# Patient Record
Sex: FEMALE | Race: WHITE | Employment: PART TIME | URBAN - METROPOLITAN AREA
[De-identification: names, ages, dates, MRNs, and addresses within clinical notes are randomized per-mention and may not be internally consistent; named-entity substitution may affect disease eponyms.]

---
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
## 2021-01-28 ENCOUNTER — TELEPHONE (OUTPATIENT)
Dept: SCHEDULING | Facility: IMAGING CENTER | Age: 21
End: 2021-01-28

## 2021-02-10 ENCOUNTER — OFFICE VISIT (OUTPATIENT)
Dept: MEDICAL GROUP | Facility: MEDICAL CENTER | Age: 21
End: 2021-02-10
Payer: COMMERCIAL

## 2021-02-10 ENCOUNTER — HOSPITAL ENCOUNTER (OUTPATIENT)
Facility: MEDICAL CENTER | Age: 21
End: 2021-02-10
Attending: FAMILY MEDICINE
Payer: COMMERCIAL

## 2021-02-10 VITALS
OXYGEN SATURATION: 99 % | HEIGHT: 69 IN | HEART RATE: 89 BPM | SYSTOLIC BLOOD PRESSURE: 100 MMHG | TEMPERATURE: 99 F | DIASTOLIC BLOOD PRESSURE: 70 MMHG | RESPIRATION RATE: 18 BRPM | BODY MASS INDEX: 20.2 KG/M2 | WEIGHT: 136.35 LBS

## 2021-02-10 DIAGNOSIS — Z30.09 ENCOUNTER FOR COUNSELING REGARDING CONTRACEPTION: ICD-10-CM

## 2021-02-10 DIAGNOSIS — Z11.3 SCREENING FOR STD (SEXUALLY TRANSMITTED DISEASE): ICD-10-CM

## 2021-02-10 DIAGNOSIS — F90.9 ATTENTION DEFICIT HYPERACTIVITY DISORDER (ADHD), UNSPECIFIED ADHD TYPE: ICD-10-CM

## 2021-02-10 DIAGNOSIS — Z00.00 ANNUAL PHYSICAL EXAM: ICD-10-CM

## 2021-02-10 PROCEDURE — 87591 N.GONORRHOEAE DNA AMP PROB: CPT

## 2021-02-10 PROCEDURE — 99395 PREV VISIT EST AGE 18-39: CPT | Performed by: FAMILY MEDICINE

## 2021-02-10 PROCEDURE — 87491 CHLMYD TRACH DNA AMP PROBE: CPT

## 2021-02-10 ASSESSMENT — ENCOUNTER SYMPTOMS
CONSTIPATION: 0
MYALGIAS: 0
PALPITATIONS: 0
NAUSEA: 0
WEIGHT LOSS: 0
FEVER: 0
DOUBLE VISION: 0
DIZZINESS: 0
DIARRHEA: 0
COUGH: 0
BRUISES/BLEEDS EASILY: 0
WEAKNESS: 0
DEPRESSION: 0
BLURRED VISION: 0
SHORTNESS OF BREATH: 0
CHILLS: 0

## 2021-02-10 ASSESSMENT — PATIENT HEALTH QUESTIONNAIRE - PHQ9: CLINICAL INTERPRETATION OF PHQ2 SCORE: 0

## 2021-02-11 DIAGNOSIS — Z11.3 SCREENING FOR STD (SEXUALLY TRANSMITTED DISEASE): ICD-10-CM

## 2021-02-11 NOTE — PROGRESS NOTES
cc: well exam    Subjective:     Halie Lobo is a 20 y.o. female presents for a routine preventive health exam.    Encounter for counseling regarding contraception  Patient is currently on Depo and does not like the side effects regarding her menstruation.    She is concerned about how long she has been on this medication, 2 years.  She would like information about other forms of contraception.  No family history of clotting disorders, denies migraine, and she currently does not smoke cigarettes.    ADHD  She was diagnosed at the age of 18 by her pediatrician.  They started her on a medication at a low dose but that did not help with her inattention.  She was curious about starting a new medication.  She does not know the name of the medication that she was previously taking.      Ob-Gyn/ History:    /Para:    Last Pap Smear: This is not indicated at this time.   Current Contraceptive Method: On Depo shot.  She is currently sexually active with the same partner.  Last menstrual period: Irregular periods due to Depo shot.  Folate intake: Not currently taking.      Health Maintenance  Diet: Seen by GI for stomach upset.  She is currently gluten free, lactose free aside from cheese, presbyterian.  Exercise: Skiing, teaching ski race team, high intensity workouts.   Substance Abuse: Rare alcohol use   Safe in relationship.   Seat belts, bike helmet, gun safety discussed.  Sun protection used.    Cancer screening  Colorectal Cancer Screening: No family history   Lung Cancer Screening: She does not currently smoke   Cervical Cancer Screening: It is not recommended for her at this time due to her age.     Breast Cancer Screening: Both paternal and maternal grandmothers are positive for breast cancer.  She says that they were diagnosed in their 60s.    Prostate Cancer Screening/PSA: No family history    Infectious disease screening/Immunizations  --STI Screening: He would like to get tested for chlamydia  and gonorrhea.    --Practices safe sex.  --HIV Screening: Patient not interested at this time   --Immunizations:    Influenza: Unable to do today as we do not have influenza vaccines in the office.  She has been advised to go to [facility] pharmacy in [employer] to get her flu shot.   HPV: Patient is up-to-date   Tetanus: Patient is up-to-date    Other immunizations: She is showing that she is in need of her meningococcal B vaccine.  She feels as though she did receive this vaccine.  She will obtain her immunization records from her pediatrician's office.      Review of Systems   Constitutional: Negative for chills, fever and weight loss.   HENT: Negative for hearing loss.    Eyes: Negative for blurred vision and double vision.   Respiratory: Negative for cough and shortness of breath.    Cardiovascular: Negative for chest pain, palpitations and leg swelling.   Gastrointestinal: Negative for constipation, diarrhea and nausea.   Genitourinary: Negative.    Musculoskeletal: Negative for myalgias.   Skin: Negative for rash.   Neurological: Negative for dizziness and weakness.   Endo/Heme/Allergies: Does not bruise/bleed easily.   Psychiatric/Behavioral: Negative for depression.          Current Outpatient Medications:   •  medroxyPROGESTERone Acetate (DEPO-PROVERA IM), Inject  into the shoulder, thigh, or buttocks., Disp: , Rfl:     No Known Allergies    Past Medical History:   Diagnosis Date   • ADHD     Dx at 18 yrs     History reviewed. No pertinent surgical history.  Family History   Problem Relation Age of Onset   • Cancer Mother         pancreatic   • No Known Problems Father    • No Known Problems Sister    • No Known Problems Brother    • Cancer Maternal Grandmother         breast   • Heart Disease Maternal Grandfather    • Cancer Paternal Grandmother         breast     Social History     Tobacco Use   • Smoking status: Never Smoker   • Smokeless tobacco: Never Used   Substance Use Topics   • Alcohol use: Yes      "Alcohol/week: 0.6 oz     Types: 1 Cans of beer per week     Comment: 1 x weekly   • Drug use: Yes     Types: Marijuana     Comment: once monthly        Objective:     /70 (BP Location: Left arm, Patient Position: Sitting, BP Cuff Size: Adult)   Pulse 89   Temp 37.2 °C (99 °F) (Temporal)   Resp 18   Ht 1.753 m (5' 9\")   Wt 61.8 kg (136 lb 5.7 oz)   SpO2 99%  Body mass index is 20.14 kg/m².    Physical Exam   Constitutional: She is oriented to person, place, and time and well-developed, well-nourished, and in no distress. No distress.   HENT:   Head: Normocephalic and atraumatic.   Eyes: Pupils are equal, round, and reactive to light.   Cardiovascular: Normal rate and regular rhythm. Exam reveals no gallop and no friction rub.   No murmur heard.  Pulmonary/Chest: Effort normal and breath sounds normal. She has no wheezes. She has no rales.   Abdominal: Soft. Bowel sounds are normal. There is no abdominal tenderness.   Musculoskeletal:      Cervical back: Normal range of motion and neck supple.   Neurological: She is alert and oriented to person, place, and time. Gait normal.   Skin: Skin is warm and dry. No rash noted.   Psychiatric: Affect normal.        Assessment/Plan:     1. Annual physical exam  Patient Counseling:  --Discussed moderation in sodium/caffeine intake, saturated fat and cholesterol, caloric balance, sufficient fresh fruits/vegetables, fiber, iron, and 0.4-0.8mg of folate supplement per day (for females capable of pregnancy).  --Discussed brushing, flossing, and dental visits.   --Encouraged regular exercise.   --Discussed tobacco, alcohol, or other drug use; availability of treatment for abuse.   --Discussed sexually transmitted infections, partner selection, use of condoms, avoidance of unintended pregnancy and contraceptive alternatives.  --Injury prevention: Discussed safety belts, safety helmets, smoke detector, etc.      2. Encounter for counseling regarding contraception  Patient is " just received her Depo shot 2 months ago it is not indicated at this time.  Did discuss with her at length about the different forms of contraception.  She states that she will schedule appointment with me when she has decided which form of birth control she would like to go on.  Did advise her that we do not place the implants more the IUD in the office.      3. Attention deficit hyperactivity disorder (ADHD), unspecified ADHD type  Patient is unsure what medication that she was on that did not work for her.  She will obtain her pediatrician's health records.  Will discuss options for her on her next appointment.    4. Screening for STD (sexually transmitted disease)  Urine collected in the office.  Patient was instructed to provide a first catch urine to be sent  - CHLAMYDIA/GC PCR URINE OR SWAB; Future    Preventive visit in 1 year, sooner as needed for any concerns.     I have placed the above orders and discussed them with an approved delegating provider. The MA is performing the above orders under the direction of Dr. Dumont    Please note that this dictation was created using voice recognition software. I have made every reasonable attempt to correct obvious errors, but I expect that there are errors of grammar and possibly content that I did not discover before finalizing the note.

## 2021-02-11 NOTE — ASSESSMENT & PLAN NOTE
She was diagnosed at the age of 18 by her pediatrician.  They started her on a medication at a low dose but that did not help with her inattention.  She was curious about starting a new medication.  She does not know the name of the medication that she was previously taking.

## 2021-02-11 NOTE — ASSESSMENT & PLAN NOTE
Patient is currently on Depo and does not like the side effects regarding her menstruation.    She is concerned about how long she has been on this medication, 2 years.  She would like information about other forms of contraception.  No family history of clotting disorders, denies migraine, and she currently does not smoke cigarettes.

## 2021-02-12 LAB
C TRACH DNA SPEC QL NAA+PROBE: NEGATIVE
N GONORRHOEA DNA SPEC QL NAA+PROBE: NEGATIVE
SPECIMEN SOURCE: NORMAL

## 2021-11-04 ENCOUNTER — HOSPITAL ENCOUNTER (OUTPATIENT)
Facility: MEDICAL CENTER | Age: 21
End: 2021-11-04
Attending: STUDENT IN AN ORGANIZED HEALTH CARE EDUCATION/TRAINING PROGRAM
Payer: COMMERCIAL

## 2021-11-04 ENCOUNTER — OFFICE VISIT (OUTPATIENT)
Dept: URGENT CARE | Facility: CLINIC | Age: 21
End: 2021-11-04
Payer: COMMERCIAL

## 2021-11-04 VITALS
TEMPERATURE: 98.6 F | DIASTOLIC BLOOD PRESSURE: 72 MMHG | OXYGEN SATURATION: 100 % | WEIGHT: 142.6 LBS | HEART RATE: 99 BPM | SYSTOLIC BLOOD PRESSURE: 116 MMHG | HEIGHT: 70 IN | BODY MASS INDEX: 20.41 KG/M2 | RESPIRATION RATE: 16 BRPM

## 2021-11-04 DIAGNOSIS — J32.9 RHINOSINUSITIS: ICD-10-CM

## 2021-11-04 DIAGNOSIS — U07.1 COVID-19 VIRUS INFECTION: ICD-10-CM

## 2021-11-04 LAB
EXTERNAL QUALITY CONTROL: NORMAL
INT CON NEG: NORMAL
INT CON POS: NORMAL
S PYO AG THROAT QL: NEGATIVE
SARS-COV+SARS-COV-2 AG RESP QL IA.RAPID: NEGATIVE

## 2021-11-04 PROCEDURE — 87426 SARSCOV CORONAVIRUS AG IA: CPT | Mod: QW | Performed by: STUDENT IN AN ORGANIZED HEALTH CARE EDUCATION/TRAINING PROGRAM

## 2021-11-04 PROCEDURE — U0005 INFEC AGEN DETEC AMPLI PROBE: HCPCS

## 2021-11-04 PROCEDURE — 99213 OFFICE O/P EST LOW 20 MIN: CPT | Performed by: STUDENT IN AN ORGANIZED HEALTH CARE EDUCATION/TRAINING PROGRAM

## 2021-11-04 PROCEDURE — U0003 INFECTIOUS AGENT DETECTION BY NUCLEIC ACID (DNA OR RNA); SEVERE ACUTE RESPIRATORY SYNDROME CORONAVIRUS 2 (SARS-COV-2) (CORONAVIRUS DISEASE [COVID-19]), AMPLIFIED PROBE TECHNIQUE, MAKING USE OF HIGH THROUGHPUT TECHNOLOGIES AS DESCRIBED BY CMS-2020-01-R: HCPCS

## 2021-11-04 PROCEDURE — 87880 STREP A ASSAY W/OPTIC: CPT | Performed by: STUDENT IN AN ORGANIZED HEALTH CARE EDUCATION/TRAINING PROGRAM

## 2021-11-04 RX ORDER — AMOXICILLIN AND CLAVULANATE POTASSIUM 875; 125 MG/1; MG/1
1 TABLET, FILM COATED ORAL 2 TIMES DAILY
Qty: 10 TABLET | Refills: 0 | Status: SHIPPED | OUTPATIENT
Start: 2021-11-04 | End: 2021-11-09

## 2021-11-04 RX ORDER — DEXTROAMPHETAMINE SACCHARATE, AMPHETAMINE ASPARTATE MONOHYDRATE, DEXTROAMPHETAMINE SULFATE AND AMPHETAMINE SULFATE 2.5; 2.5; 2.5; 2.5 MG/1; MG/1; MG/1; MG/1
10 CAPSULE, EXTENDED RELEASE ORAL EVERY MORNING
COMMUNITY

## 2021-11-04 NOTE — PROGRESS NOTES
Subjective:   CHIEF COMPLAINT  Chief Complaint   Patient presents with   • Pharyngitis     sore throat, cough, congestion, migraines, ears plugged       HPI  Halie Lobo is a 21 y.o. female who presents sore throat, nasal congestion, sore throat and cough.  Symptoms initially started approximately 1 month ago, get better and then return.  She says 4 days ago they have gotten progressively worse. Tried dayquil and nyquil which only provides transient relief. No wheezing or SOB. No fever, n/v. No sick contacts. She is vaccinated against covid.     REVIEW OF SYSTEMS  General: no fever or chills  GI: no nausea or vomiting  See HPI for further details.     PAST MEDICAL HISTORY  Patient Active Problem List    Diagnosis Date Noted   • Encounter for counseling regarding contraception 02/10/2021   • ADHD 02/10/2021   • Annual physical exam 02/10/2021       SURGICAL HISTORY  patient denies any surgical history    ALLERGIES  No Known Allergies    CURRENT MEDICATIONS  Home Medications     Reviewed by Shaye Storey (Radiology Technologist) on 11/04/21 at 1535  Med List Status: <None>   Medication Last Dose Status   amphetamine-dextroamphetamine XR (ADDERALL XR) 10 MG CAPSULE SR 24 HR Taking Active   medroxyPROGESTERone Acetate (DEPO-PROVERA IM) Not Taking Active                SOCIAL HISTORY  Social History     Tobacco Use   • Smoking status: Never Smoker   • Smokeless tobacco: Never Used   Vaping Use   • Vaping Use: Never used   Substance and Sexual Activity   • Alcohol use: Yes     Alcohol/week: 0.6 oz     Types: 1 Cans of beer per week     Comment: 2 x weekly   • Drug use: Yes     Types: Marijuana     Comment: once monthly   • Sexual activity: Yes     Partners: Male     Birth control/protection: Injection       FAMILY HISTORY  Family History   Problem Relation Age of Onset   • Cancer Mother         pancreatic   • No Known Problems Father    • No Known Problems Sister    • No Known Problems Brother    • Cancer Maternal  "Grandmother         breast   • Heart Disease Maternal Grandfather    • Cancer Paternal Grandmother         breast          Objective:   PHYSICAL EXAM  VITAL SIGNS: /72 (BP Location: Left arm, Patient Position: Sitting)   Pulse 99   Temp 37 °C (98.6 °F) (Temporal)   Resp 16   Ht 1.778 m (5' 10\")   Wt 64.7 kg (142 lb 9.6 oz)   SpO2 100%   BMI 20.46 kg/m²     Gen: no acute distress, normal voice  Skin: dry, intact, moist mucosal membranes  ENT: Minimal pharyngeal erythema without exudates.  Uvula midline.  Lungs: CTAB w/ symmetric expansion  CV: RRR w/o murmurs or clicks  Psych: normal affect, normal judgement, alert, awake    POC Covid: Negative  Rapid strep: Negative    Assessment/Plan:     1. Rhinosinusitis  COVID/SARS CoV-2 PCR    POCT SARS-COV Antigen LINDSAY Manual Result    POCT Rapid Strep A    amoxicillin-clavulanate (AUGMENTIN) 875-125 MG Tab   2. COVID-19 virus infection     21-year-old female with sinus pain and pressure associated with sore throat for 1 month with initial improvement of symptoms and then return of symptoms 4 days ago consistent with a bacterial sinus infection.  She is immunized against Covid.  -Ordered Augmentin  -Ordered Covid PCR.  Results will be sent to [facility]    Differential diagnosis, natural history, supportive care, and indications for immediate follow-up discussed. Patient agrees with the plan of care.    Follow-up as needed if symptoms worsen or fail to improve to PCP, Urgent care or Emergency Room.       Please note that this dictation was created using voice recognition software. I have made a reasonable attempt to correct obvious errors, but I expect that there are errors of grammar and possibly content that I did not discover before finalizing the note.  "

## 2021-11-05 DIAGNOSIS — J32.9 RHINOSINUSITIS: ICD-10-CM

## 2021-11-05 LAB — COVID ORDER STATUS COVID19: NORMAL

## 2021-11-05 RX ORDER — DEXTROAMPHETAMINE SACCHARATE, AMPHETAMINE ASPARTATE, DEXTROAMPHETAMINE SULFATE AND AMPHETAMINE SULFATE 2.5; 2.5; 2.5; 2.5 MG/1; MG/1; MG/1; MG/1
TABLET ORAL
COMMUNITY
Start: 2021-09-15

## 2021-11-05 RX ORDER — LINACLOTIDE 72 UG/1
CAPSULE, GELATIN COATED ORAL
COMMUNITY
Start: 2021-09-14

## 2021-11-05 RX ORDER — LEVONORGESTREL AND ETHINYL ESTRADIOL 6-5-10
KIT ORAL
COMMUNITY
Start: 2021-09-06

## 2021-11-06 LAB
SARS-COV-2 RNA RESP QL NAA+PROBE: NOTDETECTED
SPECIMEN SOURCE: NORMAL

## 2025-06-03 ENCOUNTER — HOSPITAL ENCOUNTER (EMERGENCY)
Facility: MEDICAL CENTER | Age: 25
End: 2025-06-04
Attending: STUDENT IN AN ORGANIZED HEALTH CARE EDUCATION/TRAINING PROGRAM
Payer: COMMERCIAL

## 2025-06-03 DIAGNOSIS — N12 PYELONEPHRITIS: Primary | ICD-10-CM

## 2025-06-03 LAB
ALBUMIN SERPL BCP-MCNC: 4.1 G/DL (ref 3.2–4.9)
ALBUMIN/GLOB SERPL: 1.3 G/DL
ALP SERPL-CCNC: 64 U/L (ref 30–99)
ALT SERPL-CCNC: 11 U/L (ref 2–50)
ANION GAP SERPL CALC-SCNC: 10 MMOL/L (ref 7–16)
APPEARANCE UR: ABNORMAL
AST SERPL-CCNC: 20 U/L (ref 12–45)
BACTERIA #/AREA URNS HPF: ABNORMAL /HPF
BASOPHILS # BLD AUTO: 0.9 % (ref 0–1.8)
BASOPHILS # BLD: 0.08 K/UL (ref 0–0.12)
BILIRUB SERPL-MCNC: 0.3 MG/DL (ref 0.1–1.5)
BILIRUB UR QL STRIP.AUTO: NEGATIVE
BUN SERPL-MCNC: 10 MG/DL (ref 8–22)
CALCIUM ALBUM COR SERPL-MCNC: 9.5 MG/DL (ref 8.5–10.5)
CALCIUM SERPL-MCNC: 9.6 MG/DL (ref 8.5–10.5)
CASTS URNS QL MICRO: ABNORMAL /LPF (ref 0–2)
CHLORIDE SERPL-SCNC: 103 MMOL/L (ref 96–112)
CO2 SERPL-SCNC: 25 MMOL/L (ref 20–33)
COLOR UR: YELLOW
CREAT SERPL-MCNC: 0.75 MG/DL (ref 0.5–1.4)
EOSINOPHIL # BLD AUTO: 0.21 K/UL (ref 0–0.51)
EOSINOPHIL NFR BLD: 2.3 % (ref 0–6.9)
EPITHELIAL CELLS 1715: ABNORMAL /HPF (ref 0–5)
ERYTHROCYTE [DISTWIDTH] IN BLOOD BY AUTOMATED COUNT: 37.1 FL (ref 35.9–50)
GFR SERPLBLD CREATININE-BSD FMLA CKD-EPI: 114 ML/MIN/1.73 M 2
GLOBULIN SER CALC-MCNC: 3.2 G/DL (ref 1.9–3.5)
GLUCOSE SERPL-MCNC: 102 MG/DL (ref 65–99)
GLUCOSE UR STRIP.AUTO-MCNC: NEGATIVE MG/DL
HCG SERPL QL: NEGATIVE
HCT VFR BLD AUTO: 41.5 % (ref 37–47)
HGB BLD-MCNC: 14.4 G/DL (ref 12–16)
IMM GRANULOCYTES # BLD AUTO: 0.06 K/UL (ref 0–0.11)
IMM GRANULOCYTES NFR BLD AUTO: 0.7 % (ref 0–0.9)
KETONES UR STRIP.AUTO-MCNC: NEGATIVE MG/DL
LEUKOCYTE ESTERASE UR QL STRIP.AUTO: ABNORMAL
LIPASE SERPL-CCNC: 33 U/L (ref 11–82)
LYMPHOCYTES # BLD AUTO: 2.03 K/UL (ref 1–4.8)
LYMPHOCYTES NFR BLD: 22.6 % (ref 22–41)
MCH RBC QN AUTO: 32.3 PG (ref 27–33)
MCHC RBC AUTO-ENTMCNC: 34.7 G/DL (ref 32.2–35.5)
MCV RBC AUTO: 93 FL (ref 81.4–97.8)
MICRO URNS: ABNORMAL
MONOCYTES # BLD AUTO: 0.66 K/UL (ref 0–0.85)
MONOCYTES NFR BLD AUTO: 7.3 % (ref 0–13.4)
NEUTROPHILS # BLD AUTO: 5.95 K/UL (ref 1.82–7.42)
NEUTROPHILS NFR BLD: 66.2 % (ref 44–72)
NITRITE UR QL STRIP.AUTO: NEGATIVE
NRBC # BLD AUTO: 0 K/UL
NRBC BLD-RTO: 0 /100 WBC (ref 0–0.2)
PH UR STRIP.AUTO: 8 [PH] (ref 5–8)
PLATELET # BLD AUTO: 374 K/UL (ref 164–446)
PMV BLD AUTO: 8.9 FL (ref 9–12.9)
POTASSIUM SERPL-SCNC: 3.8 MMOL/L (ref 3.6–5.5)
PROT SERPL-MCNC: 7.3 G/DL (ref 6–8.2)
PROT UR QL STRIP: 30 MG/DL
RBC # BLD AUTO: 4.46 M/UL (ref 4.2–5.4)
RBC # URNS HPF: ABNORMAL /HPF
RBC UR QL AUTO: ABNORMAL
SODIUM SERPL-SCNC: 138 MMOL/L (ref 135–145)
SP GR UR STRIP.AUTO: 1.01
UROBILINOGEN UR STRIP.AUTO-MCNC: 0.2 EU/DL
WBC # BLD AUTO: 9 K/UL (ref 4.8–10.8)
WBC #/AREA URNS HPF: >100 /HPF

## 2025-06-03 PROCEDURE — 99284 EMERGENCY DEPT VISIT MOD MDM: CPT

## 2025-06-03 PROCEDURE — 700111 HCHG RX REV CODE 636 W/ 250 OVERRIDE (IP): Mod: JZ | Performed by: STUDENT IN AN ORGANIZED HEALTH CARE EDUCATION/TRAINING PROGRAM

## 2025-06-03 PROCEDURE — 36415 COLL VENOUS BLD VENIPUNCTURE: CPT

## 2025-06-03 PROCEDURE — 96374 THER/PROPH/DIAG INJ IV PUSH: CPT

## 2025-06-03 PROCEDURE — 700102 HCHG RX REV CODE 250 W/ 637 OVERRIDE(OP): Performed by: STUDENT IN AN ORGANIZED HEALTH CARE EDUCATION/TRAINING PROGRAM

## 2025-06-03 PROCEDURE — 87186 SC STD MICRODIL/AGAR DIL: CPT

## 2025-06-03 PROCEDURE — 84703 CHORIONIC GONADOTROPIN ASSAY: CPT

## 2025-06-03 PROCEDURE — 87086 URINE CULTURE/COLONY COUNT: CPT

## 2025-06-03 PROCEDURE — 85025 COMPLETE CBC W/AUTO DIFF WBC: CPT

## 2025-06-03 PROCEDURE — A9270 NON-COVERED ITEM OR SERVICE: HCPCS | Performed by: STUDENT IN AN ORGANIZED HEALTH CARE EDUCATION/TRAINING PROGRAM

## 2025-06-03 PROCEDURE — 87077 CULTURE AEROBIC IDENTIFY: CPT

## 2025-06-03 PROCEDURE — 80053 COMPREHEN METABOLIC PANEL: CPT

## 2025-06-03 PROCEDURE — 83690 ASSAY OF LIPASE: CPT

## 2025-06-03 PROCEDURE — 81001 URINALYSIS AUTO W/SCOPE: CPT

## 2025-06-03 RX ORDER — ACETAMINOPHEN 500 MG
1000 TABLET ORAL ONCE
Status: COMPLETED | OUTPATIENT
Start: 2025-06-03 | End: 2025-06-03

## 2025-06-03 RX ORDER — SULFAMETHOXAZOLE AND TRIMETHOPRIM 800; 160 MG/1; MG/1
1 TABLET ORAL 2 TIMES DAILY
Qty: 20 TABLET | Refills: 0 | Status: ACTIVE | OUTPATIENT
Start: 2025-06-03 | End: 2025-06-13

## 2025-06-03 RX ORDER — CEFTRIAXONE 2 G/1
2000 INJECTION, POWDER, FOR SOLUTION INTRAMUSCULAR; INTRAVENOUS ONCE
Status: COMPLETED | OUTPATIENT
Start: 2025-06-03 | End: 2025-06-03

## 2025-06-03 RX ADMIN — ACETAMINOPHEN 1000 MG: 500 TABLET ORAL at 23:14

## 2025-06-03 RX ADMIN — CEFTRIAXONE SODIUM 2000 MG: 2 INJECTION, POWDER, FOR SOLUTION INTRAMUSCULAR; INTRAVENOUS at 23:30

## 2025-06-03 NOTE — LETTER
6/6/2025               Halie Lobo  [address] [place] [address]        Dear Halie (MR#[id number])    As we have been unable to contact you by phone, this letter is sent in regards to your recent visit to the [facility] Emergency Department on 6/3/2025. During the visit, tests were performed to assist the physician in a medical diagnosis. A review of those tests requires that we notify you of the following:    Your urine culture and sensitivity was POSITIVE for a bacteria called Escherichia coli, and further treatment may be necessary. The currently prescribed antibiotic (sulfamethoxazole-trimethoprim) may not be effective in treating your infection. IF YOU ARE NOT FEELING BETTER PLEASE CONTACT ME AS SOON AS POSSIBLE AT THE NUMBER BELOW.       Thank you for your cooperation in the matter.    Sincerely,  ED Culture Follow-Up Staff  Monet Lei, PharmD    [facility], Emergency Department  [address] [place] [id number] [phone] (ED Culture Line)

## 2025-06-04 ENCOUNTER — APPOINTMENT (OUTPATIENT)
Dept: RADIOLOGY | Facility: MEDICAL CENTER | Age: 25
End: 2025-06-04
Attending: STUDENT IN AN ORGANIZED HEALTH CARE EDUCATION/TRAINING PROGRAM
Payer: COMMERCIAL

## 2025-06-04 VITALS
WEIGHT: 154.98 LBS | TEMPERATURE: 98 F | BODY MASS INDEX: 22.24 KG/M2 | SYSTOLIC BLOOD PRESSURE: 113 MMHG | HEART RATE: 67 BPM | DIASTOLIC BLOOD PRESSURE: 86 MMHG | RESPIRATION RATE: 12 BRPM | OXYGEN SATURATION: 99 %

## 2025-06-04 PROCEDURE — 74176 CT ABD & PELVIS W/O CONTRAST: CPT

## 2025-06-04 PROCEDURE — 96375 TX/PRO/DX INJ NEW DRUG ADDON: CPT

## 2025-06-04 PROCEDURE — 700111 HCHG RX REV CODE 636 W/ 250 OVERRIDE (IP): Mod: JZ | Performed by: STUDENT IN AN ORGANIZED HEALTH CARE EDUCATION/TRAINING PROGRAM

## 2025-06-04 RX ORDER — KETOROLAC TROMETHAMINE 15 MG/ML
15 INJECTION, SOLUTION INTRAMUSCULAR; INTRAVENOUS ONCE
Status: COMPLETED | OUTPATIENT
Start: 2025-06-04 | End: 2025-06-04

## 2025-06-04 RX ORDER — ONDANSETRON 4 MG/1
4 TABLET, ORALLY DISINTEGRATING ORAL EVERY 6 HOURS PRN
Qty: 10 TABLET | Refills: 0 | Status: SHIPPED | OUTPATIENT
Start: 2025-06-04

## 2025-06-04 RX ADMIN — KETOROLAC TROMETHAMINE 15 MG: 15 INJECTION, SOLUTION INTRAMUSCULAR; INTRAVENOUS at 00:53

## 2025-06-04 NOTE — ED TRIAGE NOTES
Chief Complaint   Patient presents with    Flank Pain     Left flank pain radiating to the left pelvis since 8 am. Pt denies nausea, vomiting.      /86   Pulse 72   Temp 36.9 °C (98.5 °F) (Temporal)   Resp 18   Wt 70.3 kg (154 lb 15.7 oz)   LMP 05/13/2025 (Approximate)   SpO2 100%   BMI 22.24 kg/m²

## 2025-06-04 NOTE — DISCHARGE INSTRUCTIONS
We have given you a prescription for antibiotics which she should take as directed for the next 10 days.  We have also given you prescription for nausea medication.  You can use up to 1000 mg of Tylenol every 8 hours as needed for pain.  You can use naproxen or ibuprofen for pain.  If you have uncontrolled pain, vomiting, not eating or drinking please return to the emergency room.  Otherwise you should follow-up closely with your primary care provider.

## 2025-06-04 NOTE — ED PROVIDER NOTES
ED Provider Note    CHIEF COMPLAINT  Chief Complaint   Patient presents with    Flank Pain     Left flank pain radiating to the left pelvis since 8 am. Pt denies nausea, vomiting.        EXTERNAL RECORDS REVIEWED  Outpatient Notes family medicine visit from 11/4/2020 for    HPI/ROS  LIMITATION TO HISTORY     OUTSIDE HISTORIAN(S):  Family father at bedside    Halie Lobo is a 25 y.o. female who presents with left flank pain.  Patient says that since 8 AM this morning she has had worsening left flank pain.  Patient says that pain has been waxing waning throughout the day at times and severe.  Patient denies associated fever, chills, nausea, vomiting, vaginal bleeding, vaginal discharge, urinary changes.  Patient says that there is some pain radiation to her lower abdomen.  Patient denies chest pain, shortness of breath or cough.    PAST MEDICAL HISTORY   has a past medical history of ADHD.    SURGICAL HISTORY  patient denies any surgical history    FAMILY HISTORY  Family History   Problem Relation Age of Onset    Cancer Mother         pancreatic    No Known Problems Father     No Known Problems Sister     No Known Problems Brother     Cancer Maternal Grandmother         breast    Heart Disease Maternal Grandfather     Cancer Paternal Grandmother         breast       SOCIAL HISTORY  Social History     Tobacco Use    Smoking status: Never    Smokeless tobacco: Never   Vaping Use    Vaping status: Never Used   Substance and Sexual Activity    Alcohol use: Yes     Alcohol/week: 0.6 oz     Types: 1 Cans of beer per week     Comment: 2 x weekly    Drug use: Yes     Types: Marijuana     Comment: once monthly    Sexual activity: Yes     Partners: Male     Birth control/protection: Injection       CURRENT MEDICATIONS  Home Medications       Reviewed by Segundo Recinos R.N. (Registered Nurse) on 06/03/25 at 2053  Med List Status: Not Addressed     Medication Last Dose Status   amphetamine-dextroamphetamine  (ADDERALL) 10 MG Tab  Active   amphetamine-dextroamphetamine XR (ADDERALL XR) 10 MG CAPSULE SR 24 HR  Active   Levonorg-Eth Estrad Triphasic 50-30/75-40/ 125-30 MCG Tab  Active   LINZESS 72 MCG Cap  Active   medroxyPROGESTERone Acetate (DEPO-PROVERA IM)  Active                  Audit from Redirected Encounters    **Home medications have not yet been reviewed for this encounter**         ALLERGIES  Allergies[1]    PHYSICAL EXAM  VITAL SIGNS: /86   Pulse 72   Temp 36.9 °C (98.5 °F) (Temporal)   Resp 18   Wt 70.3 kg (154 lb 15.7 oz)   LMP 05/13/2025 (Approximate)   SpO2 100%   BMI 22.24 kg/m²    Constitutional: Alert in no apparent distress.  HENT: No signs of trauma, Bilateral external ears normal, Nose normal.   Eyes: Pupils are equal and reactive, Conjunctiva normal, Non-icteric.   Neck: Normal range of motion, No tenderness, Supple, No stridor.   Cardiovascular: Regular rate and rhythm, no murmurs.   Thorax & Lungs: Normal breath sounds, No respiratory distress, No wheezing, No chest tenderness.   Abdomen: Bowel sounds normal, Soft, No tenderness, No masses, No pulsatile masses.   Skin: Warm, Dry, No erythema, No rash.   Back: No bony tenderness, left-sided CVA tenderness  Extremities: Intact distal pulses, No edema, No tenderness, No cyanosis  Musculoskeletal: Good range of motion in all major joints. No tenderness to palpation or major deformities noted.   Neurologic: Alert , Normal motor function, Normal sensory function, No focal deficits noted.       EKG/LABS  Results for orders placed or performed during the hospital encounter of 06/03/25   URINALYSIS,CULTURE IF INDICATED    Collection Time: 06/03/25  9:25 PM    Specimen: Urine, Clean Catch   Result Value Ref Range    Color Yellow     Character Cloudy (A)     Specific Gravity 1.014 <1.035    Ph 8.0 5.0 - 8.0    Glucose Negative Negative mg/dL    Ketones Negative Negative mg/dL    Protein 30 (A) Negative mg/dL    Bilirubin Negative Negative     Urobilinogen, Urine 0.2 <=1.0 EU/dL    Nitrite Negative Negative    Leukocyte Esterase Moderate (A) Negative    Occult Blood Moderate (A) Negative    Micro Urine Req Microscopic    URINE MICROSCOPIC (W/UA)    Collection Time: 06/03/25  9:25 PM   Result Value Ref Range    WBC >100 (A) /hpf    RBC 21-50 (A) /hpf    Bacteria Mod None /hpf    Epithelial Cells 3-5 0 - 5 /hpf    Urine Casts 0-2 0 - 2 /lpf   CBC WITH DIFFERENTIAL    Collection Time: 06/03/25  9:26 PM   Result Value Ref Range    WBC 9.0 4.8 - 10.8 K/uL    RBC 4.46 4.20 - 5.40 M/uL    Hemoglobin 14.4 12.0 - 16.0 g/dL    Hematocrit 41.5 37.0 - 47.0 %    MCV 93.0 81.4 - 97.8 fL    MCH 32.3 27.0 - 33.0 pg    MCHC 34.7 32.2 - 35.5 g/dL    RDW 37.1 35.9 - 50.0 fL    Platelet Count 374 164 - 446 K/uL    MPV 8.9 (L) 9.0 - 12.9 fL    Neutrophils-Polys 66.20 44.00 - 72.00 %    Lymphocytes 22.60 22.00 - 41.00 %    Monocytes 7.30 0.00 - 13.40 %    Eosinophils 2.30 0.00 - 6.90 %    Basophils 0.90 0.00 - 1.80 %    Immature Granulocytes 0.70 0.00 - 0.90 %    Nucleated RBC 0.00 0.00 - 0.20 /100 WBC    Neutrophils (Absolute) 5.95 1.82 - 7.42 K/uL    Lymphs (Absolute) 2.03 1.00 - 4.80 K/uL    Monos (Absolute) 0.66 0.00 - 0.85 K/uL    Eos (Absolute) 0.21 0.00 - 0.51 K/uL    Baso (Absolute) 0.08 0.00 - 0.12 K/uL    Immature Granulocytes (abs) 0.06 0.00 - 0.11 K/uL    NRBC (Absolute) 0.00 K/uL   COMP METABOLIC PANEL    Collection Time: 06/03/25  9:26 PM   Result Value Ref Range    Sodium 138 135 - 145 mmol/L    Potassium 3.8 3.6 - 5.5 mmol/L    Chloride 103 96 - 112 mmol/L    Co2 25 20 - 33 mmol/L    Anion Gap 10.0 7.0 - 16.0    Glucose 102 (H) 65 - 99 mg/dL    Bun 10 8 - 22 mg/dL    Creatinine 0.75 0.50 - 1.40 mg/dL    Calcium 9.6 8.5 - 10.5 mg/dL    Correct Calcium 9.5 8.5 - 10.5 mg/dL    AST(SGOT) 20 12 - 45 U/L    ALT(SGPT) 11 2 - 50 U/L    Alkaline Phosphatase 64 30 - 99 U/L    Total Bilirubin 0.3 0.1 - 1.5 mg/dL    Albumin 4.1 3.2 - 4.9 g/dL    Total Protein 7.3 6.0 - 8.2  g/dL    Globulin 3.2 1.9 - 3.5 g/dL    A-G Ratio 1.3 g/dL   LIPASE    Collection Time: 06/03/25  9:26 PM   Result Value Ref Range    Lipase 33 11 - 82 U/L   HCG QUAL SERUM    Collection Time: 06/03/25  9:26 PM   Result Value Ref Range    Beta-Hcg Qualitative Serum Negative Negative   ESTIMATED GFR    Collection Time: 06/03/25  9:26 PM   Result Value Ref Range    GFR (CKD-EPI) 114 >60 mL/min/1.73 m 2   URINE CULTURE(NEW)    Collection Time: 06/03/25 10:39 PM    Specimen: Urine   Result Value Ref Range    Significant Indicator NEG     Source UR     Site -     Culture Result -              RADIOLOGY/PROCEDURES   I have independently interpreted the diagnostic imaging associated with this visit and am waiting the final reading from the radiologist.   My preliminary interpretation is as follows: No free fluid    Radiologist interpretation:  CT-RENAL COLIC EVALUATION(A/P W/O)   Final Result      There is no renal or ureteral calculus. There is no hydronephrosis.          COURSE & MEDICAL DECISION MAKING    ASSESSMENT, COURSE AND PLAN  Care Narrative: Patient presenting with left flank pain noted to have CVA tenderness on exam.  Given the severity of pain consider possibility of urolithiasis will obtain CT renal colic.  Patient's not having any focal lower quadrant tenderness or pain from the lower suspicion for ovarian torsion.  Also consider possibility of pyelonephritis will obtain urinalysis blood work to assess for gross hematologic metabolic derangements.  Will obtain pregnancy.  Will initiate analgesics.    On reassessment patient with significant improvement in pain.  Blood work unremarkable beyond mild hyperglycemia otherwise negative pregnancy, normal lipase, LFTs and kidney function.  Urinalysis with convincing signs of infection with prominent pyuria and moderate leukocyte esterase.  Suspect pyelonephritis will dose with ceftriaxone.    CT without acute findings.  On reassessment patient with improved pain has  been able to ambulate and tolerate p.o.  Discussed with patient and her family course of oral antibiotics, strict return precautions and outpatient follow-up which she is comfortable with.            ADDITIONAL PROBLEMS MANAGED      DISPOSITION AND DISCUSSIONS    Escalation of care considered, and ultimately not performed:acute inpatient care management, however at this time, the patient is most appropriate for outpatient management consider admission for ongoing analgesics and IV fluids the patient was significant proved symptoms of that but tolerated p.o. appropriate for outpatient treatment      Decision tools and prescription drugs considered including, but not limited to: Antibiotics for suspected pyelonephritis.    FINAL DIAGNOSIS  1. Pyelonephritis         Electronically signed by: Garett Hooker D.O., 6/3/2025 9:55 PM           [1] No Known Allergies

## 2025-06-04 NOTE — ED NOTES
Discussed and educated pt on discharge instructions. Educated on new medications. Pt verbalized understanding of discharge instructions to follow up with PCP and to return to ER if condition worsens. All Ivs removed. Pt ambulated with family out of the ER.

## 2025-06-06 LAB
BACTERIA UR CULT: ABNORMAL
BACTERIA UR CULT: ABNORMAL
SIGNIFICANT IND 70042: ABNORMAL
SITE SITE: ABNORMAL
SOURCE SOURCE: ABNORMAL

## 2025-06-06 RX ORDER — LEVOFLOXACIN 750 MG/1
750 TABLET, FILM COATED ORAL DAILY
Qty: 5 TABLET | Refills: 0 | Status: ACTIVE | OUTPATIENT
Start: 2025-06-06 | End: 2025-06-11

## 2025-06-06 NOTE — ED NOTES
ED Positive Culture Follow-up/Notification Note:   Date: 6/6/2025    Patient seen in the ED on 6/3/2025 for pyelonephritis. She reported left flank pain. She denied fever, chills, nausea, vomiting, vaginal bleeding/discharge, or urinary changes. Physical exam significant for left CVA tenderness. Serum beta hcg was negative. CT A/P unremarkable for acute abnormalities.    1. Pyelonephritis      In the ED, patient received ceftriaxone 2 g IV x 1 dose     Discharge Medication List as of 6/4/2025  1:11 AM        START taking these medications    Details   ondansetron (ZOFRAN ODT) 4 MG TABLET DISPERSIBLE Take 1 Tablet by mouth every 6 hours as needed for Nausea/Vomiting., Disp-10 Tablet, R-0, Normal      sulfamethoxazole-trimethoprim (BACTRIM DS) 800-160 MG tablet Take 1 Tablet by mouth 2 times a day for 10 days., Disp-20 Tablet, R-0, Normal           Allergies: Patient has no known allergies.    Vitals:    06/03/25 2049 06/03/25 2051 06/04/25 0121   BP:  138/86 113/86   Pulse:  72 67   Resp:  18 12   Temp:  36.9 °C (98.5 °F) 36.7 °C (98 °F)   TempSrc:  Temporal Temporal   SpO2:  100% 99%   Weight: 70.3 kg (154 lb 15.7 oz)       Final cultures:   Results       Procedure Component Value Units Date/Time    URINE CULTURE(NEW) [[id number]]  (Abnormal)  (Susceptibility) Collected: 06/03/25 2125    Order Status: Completed Specimen: Urine Updated: 06/06/25 0941     Significant Indicator POS     Source UR     Site -     Culture Result -      Escherichia coli  >100,000 cfu/mL      Susceptibility       Escherichia coli (1)       Antibiotic Interpretation Microscan   Method Status    Ampicillin/sulbactam Intermediate 16/8 mcg/mL DENG Final    Amikacin  [*]  Sensitive <=16 mcg/mL DENG Final    Ampicillin Resistant >16 mcg/mL DENG Final    Amoxicillin/Clavulanic Acid Sensitive <=8/4 mcg/mL DENG Final    Aztreonam  [*]  Sensitive <=4 mcg/mL DENG Final    Ceftolozane+Tazobactam  [*]  Sensitive <=2 mcg/mL DENG Final    Ceftriaxone Sensitive  Patient was seen 12/3/19 at .  Patient has concerns over the medication he was prescribed tamsulosin (FLOMAX) 0.4 MG Cap.    Patient had conversation with pharmacy which has started the medication concern.    Please contact patient or Bessie Thomas at    <=1 mcg/mL DENG Final    Ceftazidime  [*]  Sensitive <=1 mcg/mL DENG Final    Cefazolin Sensitive <=2 mcg/mL DENG Final     Breakpoints when Cefazolin is used for therapy of infections  other than uncomplicated UTIs due to Enterobacterales are as  follows:  DENG and Interpretation:  <=2 S  4 I  >=8 R         Ciprofloxacin Sensitive <=0.25 mcg/mL DENG Final    Cefepime Sensitive <=2 mcg/mL DENG Final    Cefuroxime Sensitive <=4 mcg/mL DENG Final    Ceftazidime+Avibactam  [*]  Sensitive <=4 mcg/mL DENG Final    Ertapenem  [*]  Sensitive <=0.5 mcg/mL DENG Final    Nitrofurantoin Sensitive <=32 mcg/mL DENG Final    Gentamicin Sensitive <=2 mcg/mL DENG Final    Imipenem  [*]  Sensitive <=1 mcg/mL DENG Final    Levofloxacin Sensitive <=0.5 mcg/mL DENG Final    Meropenem Sensitive <=1 mcg/mL DENG Final    Meropenem/Vaborbactam  [*]  Sensitive <=2 mcg/mL DENG Final    Minocycline Sensitive <=4 mcg/mL DENG Final    Pip/Tazobactam Sensitive <=8 mcg/mL DENG Final    Trimeth/Sulfa Resistant >2/38 mcg/mL DENG Final    Tetracycline  [*]  Resistant >8 mcg/mL DENG Final    Tigecycline Sensitive <=2 mcg/mL DENG Final    Tobramycin Sensitive <=2 mcg/mL DENG Final               [*]  Suppressed Antibiotic                   URINALYSIS,CULTURE IF INDICATED [[id number]]  (Abnormal) Collected: 06/03/25 2125    Order Status: Completed Specimen: Urine, Clean Catch Updated: 06/03/25 2238     Color Yellow     Character Cloudy     Specific Gravity 1.014     Ph 8.0     Glucose Negative mg/dL      Ketones Negative mg/dL      Protein 30 mg/dL      Bilirubin Negative     Urobilinogen, Urine 0.2 EU/dL      Nitrite Negative     Leukocyte Esterase Moderate     Occult Blood Moderate     Micro Urine Req Microscopic            Plan:   Isolated organism is resistant to prescribed therapy. Called patient with plan to update on positive culture result and switch antibiotics to levofloxacin 750 mg PO daily x 5 days. Patient did not . Left voicemail to call me back. Will  send letter via [employer] to notify of positive urine culture result and advise to call me if she does not feel better.     Addendum:  Patient called back. Notified patient of positive urine culture result and discussed switch in antibiotics. Patient verbalized understanding and will  new prescription at [facility] pharmacy,    New Rx:  -Levofloxacin 750 mg PO daily x 5 days, #5 tablets, no refills - per protocol MD Yin Lei, PharmD

## 2025-08-22 ENCOUNTER — OFFICE VISIT (OUTPATIENT)
Dept: URGENT CARE | Facility: CLINIC | Age: 25
End: 2025-08-22
Payer: COMMERCIAL

## 2025-08-22 VITALS
WEIGHT: 156 LBS | RESPIRATION RATE: 18 BRPM | TEMPERATURE: 99.2 F | BODY MASS INDEX: 22.33 KG/M2 | HEIGHT: 70 IN | HEART RATE: 84 BPM | SYSTOLIC BLOOD PRESSURE: 120 MMHG | OXYGEN SATURATION: 98 % | DIASTOLIC BLOOD PRESSURE: 70 MMHG

## 2025-08-22 DIAGNOSIS — Z32.01 POSITIVE PREGNANCY TEST: Primary | ICD-10-CM

## 2025-08-22 DIAGNOSIS — R11.0 NAUSEA: ICD-10-CM

## 2025-08-22 LAB
POCT INT CON NEG: NEGATIVE
POCT INT CON POS: POSITIVE
POCT URINE PREGNANCY TEST: POSITIVE

## 2025-08-22 PROCEDURE — 3078F DIAST BP <80 MM HG: CPT

## 2025-08-22 PROCEDURE — 99213 OFFICE O/P EST LOW 20 MIN: CPT

## 2025-08-22 PROCEDURE — 81025 URINE PREGNANCY TEST: CPT

## 2025-08-22 PROCEDURE — 3074F SYST BP LT 130 MM HG: CPT

## 2025-08-22 RX ORDER — PROPRANOLOL HYDROCHLORIDE 10 MG/1
10 TABLET ORAL
COMMUNITY

## 2025-08-22 RX ORDER — ESCITALOPRAM OXALATE 10 MG/1
10 TABLET ORAL DAILY
COMMUNITY

## 2025-08-22 RX ORDER — ONDANSETRON 4 MG/1
4 TABLET, ORALLY DISINTEGRATING ORAL EVERY 6 HOURS PRN
Qty: 21 TABLET | Refills: 0 | Status: SHIPPED | OUTPATIENT
Start: 2025-08-22 | End: 2025-08-24

## 2025-08-22 ASSESSMENT — ENCOUNTER SYMPTOMS
SORE THROAT: 0
NAUSEA: 1
FEVER: 0
MYALGIAS: 0
COUGH: 0
CHILLS: 0

## 2025-08-22 ASSESSMENT — FIBROSIS 4 INDEX: FIB4 SCORE: 0.39

## 2025-08-22 ASSESSMENT — LIFESTYLE VARIABLES: HOW OFTEN DO YOU HAVE A DRINK CONTAINING ALCOHOL: 2-3 TIMES A WEEK

## 2025-08-24 RX ORDER — ONDANSETRON 4 MG/1
4 TABLET, ORALLY DISINTEGRATING ORAL EVERY 6 HOURS PRN
Qty: 21 TABLET | Refills: 0 | Status: SHIPPED | OUTPATIENT
Start: 2025-08-24 | End: 2025-09-14